# Patient Record
Sex: FEMALE | Race: WHITE | Employment: OTHER | URBAN - METROPOLITAN AREA
[De-identification: names, ages, dates, MRNs, and addresses within clinical notes are randomized per-mention and may not be internally consistent; named-entity substitution may affect disease eponyms.]

---

## 2019-02-17 ENCOUNTER — APPOINTMENT (OUTPATIENT)
Dept: CT IMAGING | Age: 64
End: 2019-02-17
Attending: NURSE PRACTITIONER
Payer: MEDICARE

## 2019-02-17 ENCOUNTER — HOSPITAL ENCOUNTER (EMERGENCY)
Age: 64
Discharge: HOME | End: 2019-02-17
Attending: EMERGENCY MEDICINE
Payer: MEDICARE

## 2019-02-17 VITALS
DIASTOLIC BLOOD PRESSURE: 78 MMHG | HEART RATE: 90 BPM | WEIGHT: 120.37 LBS | HEIGHT: 61 IN | RESPIRATION RATE: 16 BRPM | TEMPERATURE: 98 F | SYSTOLIC BLOOD PRESSURE: 148 MMHG | BODY MASS INDEX: 22.73 KG/M2 | OXYGEN SATURATION: 97 %

## 2019-02-17 DIAGNOSIS — R10.32 ABDOMINAL PAIN, LLQ (LEFT LOWER QUADRANT): Primary | ICD-10-CM

## 2019-02-17 LAB
ALBUMIN SERPL-MCNC: 4 G/DL (ref 3.5–5)
ALBUMIN/GLOB SERPL: 1.2 {RATIO} (ref 1.1–2.2)
ALP SERPL-CCNC: 91 U/L (ref 45–117)
ALT SERPL-CCNC: 39 U/L (ref 12–78)
ANION GAP SERPL CALC-SCNC: 6 MMOL/L (ref 5–15)
APPEARANCE UR: CLEAR
AST SERPL-CCNC: 36 U/L (ref 15–37)
BACTERIA URNS QL MICRO: NEGATIVE /HPF
BASOPHILS # BLD: 0.1 K/UL (ref 0–0.1)
BASOPHILS NFR BLD: 2 % (ref 0–1)
BILIRUB SERPL-MCNC: 0.5 MG/DL (ref 0.2–1)
BILIRUB UR QL: NEGATIVE
BUN SERPL-MCNC: 12 MG/DL (ref 6–20)
BUN/CREAT SERPL: 13 (ref 12–20)
CALCIUM SERPL-MCNC: 9.5 MG/DL (ref 8.5–10.1)
CHLORIDE SERPL-SCNC: 105 MMOL/L (ref 97–108)
CO2 SERPL-SCNC: 28 MMOL/L (ref 21–32)
COLOR UR: ABNORMAL
CREAT SERPL-MCNC: 0.92 MG/DL (ref 0.55–1.02)
DIFFERENTIAL METHOD BLD: ABNORMAL
EOSINOPHIL # BLD: 0.1 K/UL (ref 0–0.4)
EOSINOPHIL NFR BLD: 1 % (ref 0–7)
EPITH CASTS URNS QL MICRO: ABNORMAL /LPF
ERYTHROCYTE [DISTWIDTH] IN BLOOD BY AUTOMATED COUNT: 12.8 % (ref 11.5–14.5)
GLOBULIN SER CALC-MCNC: 3.3 G/DL (ref 2–4)
GLUCOSE SERPL-MCNC: 87 MG/DL (ref 65–100)
GLUCOSE UR STRIP.AUTO-MCNC: NEGATIVE MG/DL
HCT VFR BLD AUTO: 44.3 % (ref 35–47)
HGB BLD-MCNC: 15.2 G/DL (ref 11.5–16)
HGB UR QL STRIP: NEGATIVE
IMM GRANULOCYTES # BLD AUTO: 0 K/UL (ref 0–0.04)
IMM GRANULOCYTES NFR BLD AUTO: 0 % (ref 0–0.5)
KETONES UR QL STRIP.AUTO: NEGATIVE MG/DL
LEUKOCYTE ESTERASE UR QL STRIP.AUTO: ABNORMAL
LIPASE SERPL-CCNC: 136 U/L (ref 73–393)
LYMPHOCYTES # BLD: 1.9 K/UL (ref 0.8–3.5)
LYMPHOCYTES NFR BLD: 29 % (ref 12–49)
MCH RBC QN AUTO: 31.5 PG (ref 26–34)
MCHC RBC AUTO-ENTMCNC: 34.3 G/DL (ref 30–36.5)
MCV RBC AUTO: 91.9 FL (ref 80–99)
MONOCYTES # BLD: 0.6 K/UL (ref 0–1)
MONOCYTES NFR BLD: 10 % (ref 5–13)
NEUTS SEG # BLD: 3.7 K/UL (ref 1.8–8)
NEUTS SEG NFR BLD: 58 % (ref 32–75)
NITRITE UR QL STRIP.AUTO: POSITIVE
NRBC # BLD: 0 K/UL (ref 0–0.01)
NRBC BLD-RTO: 0 PER 100 WBC
PH UR STRIP: 7 [PH] (ref 5–8)
PLATELET # BLD AUTO: 293 K/UL (ref 150–400)
PMV BLD AUTO: 10.1 FL (ref 8.9–12.9)
POTASSIUM SERPL-SCNC: 4.2 MMOL/L (ref 3.5–5.1)
PROT SERPL-MCNC: 7.3 G/DL (ref 6.4–8.2)
PROT UR STRIP-MCNC: NEGATIVE MG/DL
RBC # BLD AUTO: 4.82 M/UL (ref 3.8–5.2)
RBC #/AREA URNS HPF: ABNORMAL /HPF (ref 0–5)
SODIUM SERPL-SCNC: 139 MMOL/L (ref 136–145)
SP GR UR REFRACTOMETRY: 1.01 (ref 1–1.03)
UROBILINOGEN UR QL STRIP.AUTO: 1 EU/DL (ref 0.2–1)
WBC # BLD AUTO: 6.4 K/UL (ref 3.6–11)
WBC URNS QL MICRO: ABNORMAL /HPF (ref 0–4)

## 2019-02-17 PROCEDURE — 81001 URINALYSIS AUTO W/SCOPE: CPT

## 2019-02-17 PROCEDURE — 74177 CT ABD & PELVIS W/CONTRAST: CPT

## 2019-02-17 PROCEDURE — 96374 THER/PROPH/DIAG INJ IV PUSH: CPT

## 2019-02-17 PROCEDURE — 96361 HYDRATE IV INFUSION ADD-ON: CPT

## 2019-02-17 PROCEDURE — 83690 ASSAY OF LIPASE: CPT

## 2019-02-17 PROCEDURE — 74011636320 HC RX REV CODE- 636/320: Performed by: EMERGENCY MEDICINE

## 2019-02-17 PROCEDURE — 80053 COMPREHEN METABOLIC PANEL: CPT

## 2019-02-17 PROCEDURE — 85025 COMPLETE CBC W/AUTO DIFF WBC: CPT

## 2019-02-17 PROCEDURE — 36415 COLL VENOUS BLD VENIPUNCTURE: CPT

## 2019-02-17 PROCEDURE — 74011250636 HC RX REV CODE- 250/636: Performed by: NURSE PRACTITIONER

## 2019-02-17 PROCEDURE — 99283 EMERGENCY DEPT VISIT LOW MDM: CPT

## 2019-02-17 RX ORDER — KETOROLAC TROMETHAMINE 10 MG/1
10 TABLET, FILM COATED ORAL
Qty: 20 TAB | Refills: 0 | Status: SHIPPED | OUTPATIENT
Start: 2019-02-17

## 2019-02-17 RX ORDER — KETOROLAC TROMETHAMINE 30 MG/ML
15 INJECTION, SOLUTION INTRAMUSCULAR; INTRAVENOUS
Status: COMPLETED | OUTPATIENT
Start: 2019-02-17 | End: 2019-02-17

## 2019-02-17 RX ORDER — LATANOPROST 50 UG/ML
1 SOLUTION/ DROPS OPHTHALMIC
COMMUNITY

## 2019-02-17 RX ORDER — SULFAMETHOXAZOLE AND TRIMETHOPRIM 800; 160 MG/1; MG/1
1 TABLET ORAL 2 TIMES DAILY
COMMUNITY

## 2019-02-17 RX ORDER — SODIUM CHLORIDE 0.9 % (FLUSH) 0.9 %
10 SYRINGE (ML) INJECTION
Status: COMPLETED | OUTPATIENT
Start: 2019-02-17 | End: 2019-02-17

## 2019-02-17 RX ORDER — FEXOFENADINE HCL AND PSEUDOEPHEDRINE HCI 60; 120 MG/1; MG/1
1 TABLET, EXTENDED RELEASE ORAL EVERY 12 HOURS
COMMUNITY

## 2019-02-17 RX ADMIN — KETOROLAC TROMETHAMINE 15 MG: 30 INJECTION, SOLUTION INTRAMUSCULAR at 16:42

## 2019-02-17 RX ADMIN — Medication 10 ML: at 17:23

## 2019-02-17 RX ADMIN — SODIUM CHLORIDE 1000 ML: 900 INJECTION, SOLUTION INTRAVENOUS at 16:42

## 2019-02-17 RX ADMIN — IOPAMIDOL 100 ML: 755 INJECTION, SOLUTION INTRAVENOUS at 17:23

## 2019-02-17 NOTE — DISCHARGE INSTRUCTIONS
Patient Education        Abdominal Pain: Care Instructions  Your Care Instructions    Abdominal pain has many possible causes. Some aren't serious and get better on their own in a few days. Others need more testing and treatment. If your pain continues or gets worse, you need to be rechecked and may need more tests to find out what is wrong. You may need surgery to correct the problem. Don't ignore new symptoms, such as fever, nausea and vomiting, urination problems, pain that gets worse, and dizziness. These may be signs of a more serious problem. Your doctor may have recommended a follow-up visit in the next 8 to 12 hours. If you are not getting better, you may need more tests or treatment. The doctor has checked you carefully, but problems can develop later. If you notice any problems or new symptoms, get medical treatment right away. Follow-up care is a key part of your treatment and safety. Be sure to make and go to all appointments, and call your doctor if you are having problems. It's also a good idea to know your test results and keep a list of the medicines you take. How can you care for yourself at home? · Rest until you feel better. · To prevent dehydration, drink plenty of fluids, enough so that your urine is light yellow or clear like water. Choose water and other caffeine-free clear liquids until you feel better. If you have kidney, heart, or liver disease and have to limit fluids, talk with your doctor before you increase the amount of fluids you drink. · If your stomach is upset, eat mild foods, such as rice, dry toast or crackers, bananas, and applesauce. Try eating several small meals instead of two or three large ones. · Wait until 48 hours after all symptoms have gone away before you have spicy foods, alcohol, and drinks that contain caffeine. · Do not eat foods that are high in fat. · Avoid anti-inflammatory medicines such as aspirin, ibuprofen (Advil, Motrin), and naproxen (Aleve). These can cause stomach upset. Talk to your doctor if you take daily aspirin for another health problem. When should you call for help? Call 911 anytime you think you may need emergency care. For example, call if:    · You passed out (lost consciousness).     · You pass maroon or very bloody stools.     · You vomit blood or what looks like coffee grounds.     · You have new, severe belly pain.    Call your doctor now or seek immediate medical care if:    · Your pain gets worse, especially if it becomes focused in one area of your belly.     · You have a new or higher fever.     · Your stools are black and look like tar, or they have streaks of blood.     · You have unexpected vaginal bleeding.     · You have symptoms of a urinary tract infection. These may include:  ? Pain when you urinate. ? Urinating more often than usual.  ? Blood in your urine.     · You are dizzy or lightheaded, or you feel like you may faint.    Watch closely for changes in your health, and be sure to contact your doctor if:    · You are not getting better after 1 day (24 hours). Where can you learn more? Go to http://cat-geeta.info/. Enter J117 in the search box to learn more about \"Abdominal Pain: Care Instructions. \"  Current as of: September 23, 2018  Content Version: 11.9  © 8048-9934 [employer]. Care instructions adapted under license by [employer] (which disclaims liability or warranty for this information). If you have questions about a medical condition or this instruction, always ask your healthcare professional. [place] [address] any warranty or liability for your use of this information. We hope that we have addressed all of your medical concerns. The examination and treatment you received in the Emergency Department were for an emergent problem and were not intended as complete care.  It is important that you follow up with your healthcare provider(s) for ongoing care. If your symptoms worsen or do not improve as expected, and you are unable to reach your usual health care provider(s), you should return to the Emergency Department. [facility] participate in nationally recognized quality of care measures. If your blood pressure is greater than 120/80, as reported below, we urge that you seek medical care to address the potential of high blood pressure, commonly known as hypertension. Hypertension can be hereditary or can be caused by certain medical conditions, pain, stress, or \"white coat syndrome. \"       Please make an appointment with your health care provider(s) for follow up of your Emergency Department visit. VITALS:   Patient Vitals for the past 8 hrs:   Temp Pulse Resp BP SpO2   02/17/19 1455 98 °F (36.7 °C) (!) 105 18 149/70 98 %          Thank you for allowing us to provide you with medical care today. We realize that you have many choices for your emergency care needs. Please choose us in the future for any continued health care needs. Miriam Rizvi NP              Recent Results (from the past 24 hour(s))   CBC WITH AUTOMATED DIFF    Collection Time: 02/17/19  3:28 PM   Result Value Ref Range    WBC 6.4 3.6 - 11.0 K/uL    RBC 4.82 3.80 - 5.20 M/uL    HGB 15.2 11.5 - 16.0 g/dL    HCT 44.3 35.0 - 47.0 %    MCV 91.9 80.0 - 99.0 FL    MCH 31.5 26.0 - 34.0 PG    MCHC 34.3 30.0 - 36.5 g/dL    RDW 12.8 11.5 - 14.5 %    PLATELET 955 482 - 980 K/uL    MPV 10.1 8.9 - 12.9 FL    NRBC 0.0 0  WBC    ABSOLUTE NRBC 0.00 0.00 - 0.01 K/uL    NEUTROPHILS 58 32 - 75 %    LYMPHOCYTES 29 12 - 49 %    MONOCYTES 10 5 - 13 %    EOSINOPHILS 1 0 - 7 %    BASOPHILS 2 (H) 0 - 1 %    IMMATURE GRANULOCYTES 0 0.0 - 0.5 %    ABS. NEUTROPHILS 3.7 1.8 - 8.0 K/UL    ABS. LYMPHOCYTES 1.9 0.8 - 3.5 K/UL    ABS. MONOCYTES 0.6 0.0 - 1.0 K/UL    ABS. EOSINOPHILS 0.1 0.0 - 0.4 K/UL    ABS.  BASOPHILS 0.1 0.0 - 0.1 K/UL ABS. IMM. GRANS. 0.0 0.00 - 0.04 K/UL    DF AUTOMATED     METABOLIC PANEL, COMPREHENSIVE    Collection Time: 02/17/19  3:28 PM   Result Value Ref Range    Sodium 139 136 - 145 mmol/L    Potassium 4.2 3.5 - 5.1 mmol/L    Chloride 105 97 - 108 mmol/L    CO2 28 21 - 32 mmol/L    Anion gap 6 5 - 15 mmol/L    Glucose 87 65 - 100 mg/dL    BUN 12 6 - 20 MG/DL    Creatinine 0.92 0.55 - 1.02 MG/DL    BUN/Creatinine ratio 13 12 - 20      GFR est AA >60 >60 ml/min/1.73m2    GFR est non-AA >60 >60 ml/min/1.73m2    Calcium 9.5 8.5 - 10.1 MG/DL    Bilirubin, total 0.5 0.2 - 1.0 MG/DL    ALT (SGPT) 39 12 - 78 U/L    AST (SGOT) 36 15 - 37 U/L    Alk. phosphatase 91 45 - 117 U/L    Protein, total 7.3 6.4 - 8.2 g/dL    Albumin 4.0 3.5 - 5.0 g/dL    Globulin 3.3 2.0 - 4.0 g/dL    A-G Ratio 1.2 1.1 - 2.2     LIPASE    Collection Time: 02/17/19  3:28 PM   Result Value Ref Range    Lipase 136 73 - 393 U/L   URINALYSIS W/ RFLX MICROSCOPIC    Collection Time: 02/17/19  3:29 PM   Result Value Ref Range    Color ORANGE, color may affect macroscopic results      Appearance CLEAR CLEAR      Specific gravity 1.007 1.003 - 1.030      pH (UA) 7.0 5.0 - 8.0      Protein NEGATIVE  NEG mg/dL    Glucose NEGATIVE  NEG mg/dL    Ketone NEGATIVE  NEG mg/dL    Bilirubin NEGATIVE  NEG      Blood NEGATIVE  NEG      Urobilinogen 1.0 0.2 - 1.0 EU/dL    Nitrites POSITIVE (A) NEG      Leukocyte Esterase SMALL (A) NEG      WBC 0-4 0 - 4 /hpf    RBC 0-5 0 - 5 /hpf    Epithelial cells FEW FEW /lpf    Bacteria NEGATIVE  NEG /hpf       Ct Abd Pelv W Cont    Result Date: 2/17/2019  INDICATION: LLQ pain COMPARISON: None TECHNIQUE: Following the uneventful intravenous administration of 100 cc Isovue-370, thin axial images were obtained through the abdomen and pelvis. Coronal and sagittal reconstructions were generated. Oral contrast was not administered.  CT dose reduction was achieved through use of a standardized protocol tailored for this examination and automatic exposure control for dose modulation. FINDINGS: LUNG BASES: A curvilinear density at the left lung base posterolaterally which is associated with a calcification extends to the pleural surface. This most likely represents parenchymal scarring but since this cannot be identified on previous chest x-rays, follow-up may be considered. LIVER: No mass or biliary dilatation. GALLBLADDER: Absent SPLEEN: No mass. PANCREAS: No mass or ductal dilatation. ADRENALS: Unremarkable. KIDNEYS: No mass, calculus, or hydronephrosis. GI: No dilatation or wall thickening. There is a moderate amount of fecal material throughout the colon. APPENDIX: Not identified PERITONEUM: No ascites or pneumoperitoneum. RETROPERITONEUM: No lymphadenopathy or aortic aneurysm. URINARY BLADDER: No mass or calculus. PELVIS: No adenopathy or abnormal mass. BONES: Degenerative changes of the mid lumbar spine noted. ADDITIONAL COMMENTS: N/A     IMPRESSION: 1. No acute finding the abdomen or pelvis. 2. Moderate amount of fecal material throughout the colon. 3. Finding at the left lung base as described above. Follow-up unenhanced chest CT in 4-6 months may be considered.

## 2019-02-17 NOTE — ED PROVIDER NOTES
EMERGENCY DEPARTMENT HISTORY AND PHYSICAL EXAM 
 
 
Date: 2/17/2019 Patient Name: Sathish Obrien History of Presenting Illness Chief Complaint Patient presents with  Pelvic Pain The patient presents to the ED with complaints of pelvic pain, recently diagnosed with a kidney infection and taking bactrim. Was told to come to the ED if she does not get better. History Provided By: Patient HPI: Sathish Obrien, 70 y.o. female with PMHx significant for GERD and recurrent UTIs currently followed by Urology and Bactrim, presents ambulatory from home to the ED with cc of ongoing left lower quad pain. Seen by Urology and diagnosed with UTI started on Bactrim two days ago. Symptoms have progressed and she was referred to the ED for evaluation. She has not taken anything for pain. Pt denies fevers, chills, night sweats, chest pain, pressure, SOB, VANCE, PND, orthopnea, n/v/d, melena, hematuria, dysuria, constipation, HA, dizziness, and syncope There are no other complaints, changes, or physical findings at this time. PCP: Michael Renteria MD 
 
No current facility-administered medications on file prior to encounter. Current Outpatient Medications on File Prior to Encounter Medication Sig Dispense Refill  PROGESTERONE Take 150 mg by mouth daily.  fexofenadine-pseudoephedrine (ALLEGRA-D 12 HOUR)  mg Tb12 Take 1 Tab by mouth every twelve (12) hours.  fluticasone propionate (FLONASE NA) by Nasal route.  acetaminophen/diphenhydramine (TYLENOL PM PO) Take  by mouth.  phenazopy/facial-body wipes (PHENAZOPYRIDINE) 99.5 mg by Does Not Apply route as needed.  trimethoprim-sulfamethoxazole (BACTRIM DS) 160-800 mg per tablet Take 1 Tab by mouth two (2) times a day.  latanoprost (XALATAN) 0.005 % ophthalmic solution Administer 1 Drop to both eyes nightly.  lansoprazole (PREVACID) 15 mg capsule Take  by mouth Daily (before breakfast).  ciprofloxacin (CIPRO) 250 mg tablet Take 500 mg by mouth as needed. As needed for frequent bladder infections.  ascorbic acid (VITAMIN C) 250 mg tablet Take  by mouth daily.  Cholecalciferol, Vitamin D3, (VITAMIN D) 1,000 unit Cap Take  by mouth daily.  OTHER Past History Past Medical History: 
Past Medical History:  
Diagnosis Date  Gastrointestinal disorder   
 gerd Past Surgical History: 
Past Surgical History:  
Procedure Laterality Date  HX CHOLECYSTECTOMY Family History: 
History reviewed. No pertinent family history. Social History: 
Social History Tobacco Use  Smoking status: Never Smoker  Smokeless tobacco: Never Used Substance Use Topics  Alcohol use: No  
  Frequency: Never  Drug use: No  
 
 
Allergies: Allergies Allergen Reactions  Macrodantin [Nitrofurantoin Macrocrystalline] Anaphylaxis  Pcn [Penicillins] Anaphylaxis  Tetracycline Nausea and Vomiting Review of Systems Review of Systems Constitutional: Negative for activity change, appetite change, chills, diaphoresis, fatigue, fever and unexpected weight change. HENT: Negative for congestion, ear pain, rhinorrhea, sinus pressure, sore throat and tinnitus. Eyes: Negative for photophobia, pain, discharge and visual disturbance. Respiratory: Negative for apnea, cough, choking, chest tightness, shortness of breath, wheezing and stridor. Cardiovascular: Negative for chest pain, palpitations and leg swelling. Gastrointestinal: Positive for abdominal pain. Negative for constipation, diarrhea, nausea and vomiting. Endocrine: Negative for polydipsia, polyphagia and polyuria. Genitourinary: Negative for decreased urine volume, dyspareunia, dysuria, enuresis, flank pain, frequency, hematuria and urgency. Musculoskeletal: Negative for arthralgias, back pain, gait problem, myalgias and neck pain. Skin: Negative for color change, pallor, rash and wound. Allergic/Immunologic: Negative for immunocompromised state. Neurological: Negative for dizziness, seizures, syncope, weakness, light-headedness and headaches. Hematological: Does not bruise/bleed easily. Psychiatric/Behavioral: Negative for agitation and confusion. The patient is not nervous/anxious. Physical Exam  
Physical Exam  
Constitutional: She is oriented to person, place, and time. She appears well-developed and well-nourished. No distress. HENT:  
Head: Normocephalic. Right Ear: External ear normal.  
Left Ear: External ear normal.  
Mouth/Throat: Oropharynx is clear and moist. No oropharyngeal exudate. Eyes: Conjunctivae and EOM are normal. Pupils are equal, round, and reactive to light. Right eye exhibits no discharge. Left eye exhibits no discharge. No scleral icterus. Neck: Normal range of motion. Neck supple. No JVD present. No tracheal deviation present. No thyromegaly present. Cardiovascular: Normal rate, regular rhythm, normal heart sounds and intact distal pulses. Exam reveals no gallop and no friction rub. No murmur heard. Pulmonary/Chest: Effort normal and breath sounds normal. No stridor. No respiratory distress. She has no wheezes. She has no rales. She exhibits no tenderness. Abdominal: Soft. Bowel sounds are normal. She exhibits no distension and no mass. There is tenderness in the left lower quadrant. There is no rebound and no guarding. Musculoskeletal: Normal range of motion. She exhibits no edema or tenderness. Lymphadenopathy:  
  She has no cervical adenopathy. Neurological: She is alert and oriented to person, place, and time. She displays normal reflexes. No cranial nerve deficit. Coordination normal.  
Skin: Skin is warm and dry. No rash noted. She is not diaphoretic. No erythema. No pallor. Psychiatric: She has a normal mood and affect.  Her behavior is normal. Judgment and thought content normal.  
Nursing note and vitals reviewed. Diagnostic Study Results Labs - Recent Results (from the past 12 hour(s)) CBC WITH AUTOMATED DIFF Collection Time: 02/17/19  3:28 PM  
Result Value Ref Range WBC 6.4 3.6 - 11.0 K/uL  
 RBC 4.82 3.80 - 5.20 M/uL  
 HGB 15.2 11.5 - 16.0 g/dL HCT 44.3 35.0 - 47.0 % MCV 91.9 80.0 - 99.0 FL  
 MCH 31.5 26.0 - 34.0 PG  
 MCHC 34.3 30.0 - 36.5 g/dL  
 RDW 12.8 11.5 - 14.5 % PLATELET 038 755 - 150 K/uL MPV 10.1 8.9 - 12.9 FL  
 NRBC 0.0 0  WBC ABSOLUTE NRBC 0.00 0.00 - 0.01 K/uL NEUTROPHILS 58 32 - 75 % LYMPHOCYTES 29 12 - 49 % MONOCYTES 10 5 - 13 % EOSINOPHILS 1 0 - 7 % BASOPHILS 2 (H) 0 - 1 % IMMATURE GRANULOCYTES 0 0.0 - 0.5 % ABS. NEUTROPHILS 3.7 1.8 - 8.0 K/UL  
 ABS. LYMPHOCYTES 1.9 0.8 - 3.5 K/UL  
 ABS. MONOCYTES 0.6 0.0 - 1.0 K/UL  
 ABS. EOSINOPHILS 0.1 0.0 - 0.4 K/UL  
 ABS. BASOPHILS 0.1 0.0 - 0.1 K/UL  
 ABS. IMM. GRANS. 0.0 0.00 - 0.04 K/UL  
 DF AUTOMATED METABOLIC PANEL, COMPREHENSIVE Collection Time: 02/17/19  3:28 PM  
Result Value Ref Range Sodium 139 136 - 145 mmol/L Potassium 4.2 3.5 - 5.1 mmol/L Chloride 105 97 - 108 mmol/L  
 CO2 28 21 - 32 mmol/L Anion gap 6 5 - 15 mmol/L Glucose 87 65 - 100 mg/dL BUN 12 6 - 20 MG/DL Creatinine 0.92 0.55 - 1.02 MG/DL  
 BUN/Creatinine ratio 13 12 - 20 GFR est AA >60 >60 ml/min/1.73m2 GFR est non-AA >60 >60 ml/min/1.73m2 Calcium 9.5 8.5 - 10.1 MG/DL Bilirubin, total 0.5 0.2 - 1.0 MG/DL  
 ALT (SGPT) 39 12 - 78 U/L  
 AST (SGOT) 36 15 - 37 U/L Alk. phosphatase 91 45 - 117 U/L Protein, total 7.3 6.4 - 8.2 g/dL Albumin 4.0 3.5 - 5.0 g/dL Globulin 3.3 2.0 - 4.0 g/dL A-G Ratio 1.2 1.1 - 2.2 LIPASE Collection Time: 02/17/19  3:28 PM  
Result Value Ref Range Lipase 136 73 - 393 U/L  
URINALYSIS W/ RFLX MICROSCOPIC  Collection Time: 02/17/19  3:29 PM  
 Result Value Ref Range Color ORANGE, color may affect macroscopic results Appearance CLEAR CLEAR Specific gravity 1.007 1.003 - 1.030    
 pH (UA) 7.0 5.0 - 8.0 Protein NEGATIVE  NEG mg/dL Glucose NEGATIVE  NEG mg/dL Ketone NEGATIVE  NEG mg/dL Bilirubin NEGATIVE  NEG Blood NEGATIVE  NEG Urobilinogen 1.0 0.2 - 1.0 EU/dL Nitrites POSITIVE (A) NEG Leukocyte Esterase SMALL (A) NEG    
 WBC 0-4 0 - 4 /hpf  
 RBC 0-5 0 - 5 /hpf Epithelial cells FEW FEW /lpf Bacteria NEGATIVE  NEG /hpf Radiologic Studies -  
CT ABD PELV W CONT Final Result IMPRESSION:  
  
1. No acute finding the abdomen or pelvis. 2. Moderate amount of fecal material throughout the colon. 3. Finding at the left lung base as described above. Follow-up unenhanced chest  
CT in 4-6 months may be considered. CT Results  (Last 48 hours) 02/17/19 1722  CT ABD PELV W CONT Final result Impression:  IMPRESSION:  
   
1. No acute finding the abdomen or pelvis. 2. Moderate amount of fecal material throughout the colon. 3. Finding at the left lung base as described above. Follow-up unenhanced chest  
CT in 4-6 months may be considered. Narrative:  INDICATION: LLQ pain COMPARISON: None TECHNIQUE:   
Following the uneventful intravenous administration of 100 cc Isovue-370, thin  
axial images were obtained through the abdomen and pelvis. Coronal and sagittal  
reconstructions were generated. Oral contrast was not administered. CT dose  
reduction was achieved through use of a standardized protocol tailored for this  
examination and automatic exposure control for dose modulation. FINDINGS:   
LUNG BASES: A curvilinear density at the left lung base posterolaterally which  
is associated with a calcification extends to the pleural surface.  This most  
likely represents parenchymal scarring but since this cannot be identified on  
 previous chest x-rays, follow-up may be considered. LIVER: No mass or biliary dilatation. GALLBLADDER: Absent SPLEEN: No mass. PANCREAS: No mass or ductal dilatation. ADRENALS: Unremarkable. KIDNEYS: No mass, calculus, or hydronephrosis. GI: No dilatation or wall thickening. There is a moderate amount of fecal  
material throughout the colon. APPENDIX: Not identified PERITONEUM: No ascites or pneumoperitoneum. RETROPERITONEUM: No lymphadenopathy or aortic aneurysm. URINARY BLADDER: No mass or calculus. PELVIS: No adenopathy or abnormal mass. BONES: Degenerative changes of the mid lumbar spine noted. ADDITIONAL COMMENTS: N/A  
   
  
  
 
CXR Results  (Last 48 hours) None Medical Decision Making I am the first provider for this patient. I reviewed the vital signs, available nursing notes, past medical history, past surgical history, family history and social history. Vital Signs-Reviewed the patient's vital signs. Patient Vitals for the past 12 hrs: 
 Temp Pulse Resp BP SpO2  
02/17/19 1455 98 °F (36.7 °C) (!) 105 18 149/70 98 % Pulse Oximetry Analysis - 98% on RA Cardiac Monitor:  
Rate: 105 bpm 
 
 
Records Reviewed: Nursing Notes, Old Medical Records, Previous electrocardiograms, Previous Radiology Studies and Previous Laboratory Studies Provider Notes (Medical Decision Making): Abdominal pain  
 
Routine laboratory data and UA 
IVF Toradol CT abd/pelvis ED Course:  
Initial assessment performed. The patients presenting problems have been discussed, and they are in agreement with the care plan formulated and outlined with them. I have encouraged them to ask questions as they arise throughout their visit. Stable, ambulatory pt in [facility] Critical Care Time:  
0 Disposition: 
Discharge to home with PCP and Urology follow up PLAN: 
1. Current Discharge Medication List  
  
START taking these medications Details ketorolac (TORADOL) 10 mg tablet Take 1 Tab by mouth every six (6) hours as needed for Pain. Qty: 20 Tab, Refills: 0 Associated Diagnoses: Abdominal pain, LLQ (left lower quadrant) 2. Follow-up Information Follow up With Specialties Details Why Contact Info Estefanía Blount MD Family Practice In 2 days  [address] [place] [address] 
[phone] Return to ED if worse Diagnosis Clinical Impression: 1. Abdominal pain, LLQ (left lower quadrant) Attestations: 
 
Ritu Tejeda NP 
6:16 PM

## 2019-02-17 NOTE — ED NOTES
Susie Link NP reviewed discharge instructions with the patient. The patient verbalized understanding. All questions and concerns were addressed. The patient declined a wheelchair and is discharged ambulatory in the care of family members with instructions and prescriptions in hand. Pt is alert and oriented x 4. Respirations are clear and unlabored.

## 2020-04-01 ENCOUNTER — OFFICE VISIT (OUTPATIENT)
Dept: SURGERY | Age: 65
End: 2020-04-01

## 2020-04-01 VITALS
OXYGEN SATURATION: 97 % | HEIGHT: 61 IN | SYSTOLIC BLOOD PRESSURE: 135 MMHG | HEART RATE: 85 BPM | BODY MASS INDEX: 23.98 KG/M2 | WEIGHT: 127 LBS | TEMPERATURE: 97.9 F | DIASTOLIC BLOOD PRESSURE: 68 MMHG

## 2020-04-01 DIAGNOSIS — E04.1 RIGHT THYROID NODULE: Primary | ICD-10-CM

## 2020-04-01 RX ORDER — AZELASTINE 1 MG/ML
SPRAY, METERED NASAL
COMMUNITY
Start: 2020-03-19

## 2020-04-01 RX ORDER — PSEUDOEPHEDRINE HCL 120 MG/1
120 TABLET, FILM COATED, EXTENDED RELEASE ORAL
COMMUNITY

## 2020-04-01 NOTE — LETTER
4/1/20 Patient: Katja Lenz YOB: 1955 Date of Visit: 4/1/2020 Marc Millan MD 
[id number] [address] [address] VIA Facsimile: [phone] Dear Marc Millan MD, Thank you for referring Ms. Heath Cárdenas to [address] [place] [address] for evaluation. My notes for this consultation are attached. If you have questions, please do not hesitate to call me. I look forward to following your patient along with you.  
 
 
Sincerely, 
 
Daniella Erwin MD

## 2020-04-01 NOTE — PROGRESS NOTES
HISTORY OF PRESENT ILLNESS  Alicia Higgins is a 75 y.o. female who comes in for consultation by Therese Emery MD for a thyroid nodule  HPI  She had a cold recently and noted a tender right neck mass. She saw Therese Emery MD and had labs and an US. The US demonstrated a 2.2 cm mid thyroid nodule, partially cystic and several other smaller nodules. Biopsy of the larger lesion was recommended. She was euthyroid on labs. She states that the tenderness has resolved and denies dysphagia, dysphonia, neck pressure, pain, palpitations, fever, chills or sweats, prior ionizing radiation exposure, family hx thyroid disease. Past Medical History:   Diagnosis Date    Depression     Gastrointestinal disorder     gerd    GERD (gastroesophageal reflux disease)      Past Surgical History:   Procedure Laterality Date    HX CHOLECYSTECTOMY       Family History   Problem Relation Age of Onset    Heart Disease Mother     Cancer Father         lung    Cancer Paternal Aunt         melonoma    Diabetes Paternal Grandmother     Heart Disease Paternal Grandmother      Social History     Tobacco Use    Smoking status: Never Smoker    Smokeless tobacco: Never Used   Substance Use Topics    Alcohol use: No     Frequency: Never    Drug use: No     Current Outpatient Medications   Medication Sig    azelastine (ASTELIN) 137 mcg (0.1 %) nasal spray USE 2 SPRAYS IN EACH NOSTRIL TWICE A DAY    MAGNESIUM PO Take  by mouth.  pseudoephedrine CR (Sinus 12 Hour) 120 mg CR tablet Take 120 mg by mouth two (2) times daily as needed for Congestion.  PROGESTERONE Take 150 mg by mouth daily.  acetaminophen/diphenhydramine (TYLENOL PM PO) Take  by mouth.  phenazopy/facial-body wipes (PHENAZOPYRIDINE) 99.5 mg by Does Not Apply route as needed.  OTHER     lansoprazole (PREVACID) 15 mg capsule Take  by mouth Daily (before breakfast).  ciprofloxacin (CIPRO) 250 mg tablet Take 500 mg by mouth as needed.  As needed for frequent bladder infections.  ascorbic acid (VITAMIN C) 250 mg tablet Take  by mouth daily.  Cholecalciferol, Vitamin D3, (VITAMIN D) 1,000 unit Cap Take  by mouth daily.  fexofenadine-pseudoephedrine (ALLEGRA-D 12 HOUR)  mg Tb12 Take 1 Tab by mouth every twelve (12) hours.  fluticasone propionate (FLONASE NA) by Nasal route.  trimethoprim-sulfamethoxazole (BACTRIM DS) 160-800 mg per tablet Take 1 Tab by mouth two (2) times a day.  latanoprost (XALATAN) 0.005 % ophthalmic solution Administer 1 Drop to both eyes nightly.  ketorolac (TORADOL) 10 mg tablet Take 1 Tab by mouth every six (6) hours as needed for Pain. No current facility-administered medications for this visit. Allergies   Allergen Reactions    Macrodantin [Nitrofurantoin Macrocrystalline] Anaphylaxis    Pcn [Penicillins] Anaphylaxis    Tetracycline Nausea and Vomiting       Review of Systems   Constitutional: Positive for chills and malaise/fatigue. Negative for diaphoresis, fever and weight loss. HENT: Negative for sore throat. Eyes: Negative for blurred vision and discharge. Respiratory: Negative for cough, shortness of breath and wheezing. Cardiovascular: Negative for chest pain, palpitations, orthopnea, claudication and leg swelling. Gastrointestinal: Positive for constipation and heartburn. Negative for abdominal pain, diarrhea, melena, nausea and vomiting. Genitourinary: Negative for dysuria, flank pain, frequency and hematuria. Musculoskeletal: Positive for myalgias. Negative for back pain, joint pain and neck pain. Skin: Negative for rash. Neurological: Positive for dizziness. Negative for speech change, focal weakness, seizures, loss of consciousness, weakness and headaches. Endo/Heme/Allergies: Does not bruise/bleed easily. Psychiatric/Behavioral: Positive for depression. Negative for memory loss.      Visit Vitals  /68   Pulse 85   Temp 97.9 °F (36.6 °C)   Ht 5' 1\" (1.549 m) Wt 57.6 kg (127 lb)   SpO2 97%   BMI 24.00 kg/m²       Physical Exam  Constitutional:       General: She is not in acute distress. Appearance: She is well-developed. She is not diaphoretic. HENT:      Head: Normocephalic and atraumatic. Mouth/Throat:      Pharynx: No oropharyngeal exudate. Eyes:      General: No scleral icterus. Conjunctiva/sclera: Conjunctivae normal.      Pupils: Pupils are equal, round, and reactive to light. Neck:      Musculoskeletal: Normal range of motion and neck supple. Thyroid: Thyroid mass (2 cm mass) present. No thyromegaly or thyroid tenderness. Vascular: No JVD. Trachea: Trachea and phonation normal. No tracheal deviation. Cardiovascular:      Rate and Rhythm: Normal rate and regular rhythm. Heart sounds: No murmur. No friction rub. No gallop. Pulmonary:      Effort: Pulmonary effort is normal. No respiratory distress. Breath sounds: Normal breath sounds. No wheezing or rales. Abdominal:      General: Bowel sounds are normal. There is no distension. Palpations: Abdomen is soft. There is no mass. Tenderness: There is no abdominal tenderness. There is no guarding or rebound. Musculoskeletal: Normal range of motion. Lymphadenopathy:      Cervical: No cervical adenopathy. Right cervical: No superficial or deep cervical adenopathy. Left cervical: No superficial or deep cervical adenopathy. Skin:     General: Skin is warm and dry. Coloration: Skin is not pale. Findings: No erythema or rash. Neurological:      Mental Status: She is alert and oriented to person, place, and time. Cranial Nerves: No cranial nerve deficit. Psychiatric:         Behavior: Behavior normal.         Thought Content: Thought content normal.         Judgment: Judgment normal.         ASSESSMENT and PLAN  1. Right dominant thyroid nodule/mostly cystic and left smaller nodules.   I suspect this to be a multinodular goiter but need to r/o malignancy. I explained about the anatomy and pathophysiology of thyroid nodules/disease. I explained about possible malignancy. I discussed FNA, observation, possible thyroid suppression pending FNA, and total thyroidectomy. Risks of surgery include bleeding (potentially requiring emergent exploration at bedside), infection, parathyroid injury/removal requiring supplemental calcium +/- vit d, recurrent laryngeal/superior laryngeal nerve injury resulting in vocal cord paralysis, voice changes and fatigue, aspiration, further surgery, need for lifelong thyroid supplementation.   2.  Fatigue      She preferred moving toward US FNA of the right thyroid nodule and we did that today    We will call with results      Donny Durand MD FACS

## 2020-04-01 NOTE — PROGRESS NOTES
Chief Complaint   Patient presents with    Thyroid Problem     Pt seen @ the request of Dr. Good Damon to evaluate thyroid mass. 1. Have you been to the ER, urgent care clinic since your last visit? Hospitalized since your last visit? New consult    2. Have you seen or consulted any other health care providers outside of the [address] [place] [address] since your last visit? New consult  Include any pap smears or colon screening. Discussed advanced directive. Patient states that he/she does not have an advanced directive.

## 2020-04-02 NOTE — PROGRESS NOTES
Procedure Note    Pre Procedure Diagnosis:  Right thyroid nodule  Post Procedure Diagnosis:  Right thyroid nodule  Procedure:  1. Ultrasound guided FNA of right thyroid nodule  Surgeon:   Facundo Nascimento MD FACS    EBL minimal    Procedure:    After informed consent, I utilized a [employer] with a high frequency linear array transducer and two 22 and two 25 gauge needles to perform several passes through a 2.3 cm right thyroid heterogeneous nodule. Specimens were placed in specialized containers from [facility]. There was highly viscous colloid type fluid removed as well. She tolerated it well.     Specimen:   Right thyroid nodule    Signed  Facundo Nascimento MD FACS

## 2020-04-06 ENCOUNTER — TELEPHONE (OUTPATIENT)
Dept: SURGERY | Age: 65
End: 2020-04-06

## 2020-04-08 NOTE — TELEPHONE ENCOUNTER
Spoke w patient    She is feeling better and thinks it is smaller      She prefers repeat US in 6 months at Dr Tayla Rockwell office and if stable plan ongoing observation but if larger she would come back for a repeat FNA    Bessie Veloz MD FACS

## 2020-09-04 ENCOUNTER — TELEPHONE (OUTPATIENT)
Dept: INTERNAL MEDICINE CLINIC | Age: 65
End: 2020-09-04

## 2020-09-04 NOTE — TELEPHONE ENCOUNTER
[address] [facility] Office Pool               Appointment not available     [facility] first and last name and relationship to patient (if not the patient):       Best contact number: [phone]       Preferred date and time: First available       Scheduled appointment date and time: N/A       Reason for appointment: Pt is looking to schedule a NP appointment with the preferred provider but no slots available       Details to clarify the request:       Channel E Ruiz     Copy/Paste  ENVERA

## 2020-11-23 RX ORDER — FLUTICASONE PROPIONATE 50 MCG
SPRAY, SUSPENSION (ML) NASAL
COMMUNITY
End: 2020-11-24 | Stop reason: SDUPTHER

## 2020-11-24 ENCOUNTER — OFFICE VISIT (OUTPATIENT)
Dept: SURGERY | Age: 65
End: 2020-11-24
Payer: MEDICARE

## 2020-11-24 VITALS
WEIGHT: 123.3 LBS | HEIGHT: 61 IN | RESPIRATION RATE: 16 BRPM | HEART RATE: 72 BPM | BODY MASS INDEX: 23.28 KG/M2 | TEMPERATURE: 97.5 F | DIASTOLIC BLOOD PRESSURE: 74 MMHG | SYSTOLIC BLOOD PRESSURE: 126 MMHG | OXYGEN SATURATION: 96 %

## 2020-11-24 DIAGNOSIS — E04.1 RIGHT THYROID NODULE: Primary | ICD-10-CM

## 2020-11-24 PROCEDURE — G8400 PT W/DXA NO RESULTS DOC: HCPCS | Performed by: SURGERY

## 2020-11-24 PROCEDURE — 3017F COLORECTAL CA SCREEN DOC REV: CPT | Performed by: SURGERY

## 2020-11-24 PROCEDURE — 1090F PRES/ABSN URINE INCON ASSESS: CPT | Performed by: SURGERY

## 2020-11-24 PROCEDURE — G8536 NO DOC ELDER MAL SCRN: HCPCS | Performed by: SURGERY

## 2020-11-24 PROCEDURE — G8427 DOCREV CUR MEDS BY ELIG CLIN: HCPCS | Performed by: SURGERY

## 2020-11-24 PROCEDURE — 99213 OFFICE O/P EST LOW 20 MIN: CPT | Performed by: SURGERY

## 2020-11-24 PROCEDURE — G8420 CALC BMI NORM PARAMETERS: HCPCS | Performed by: SURGERY

## 2020-11-24 PROCEDURE — G8432 DEP SCR NOT DOC, RNG: HCPCS | Performed by: SURGERY

## 2020-11-24 PROCEDURE — 1101F PT FALLS ASSESS-DOCD LE1/YR: CPT | Performed by: SURGERY

## 2020-11-24 RX ORDER — TIMOLOL MALEATE 5 MG/ML
SOLUTION/ DROPS OPHTHALMIC
COMMUNITY

## 2020-11-24 NOTE — LETTER
11/24/20 Patient: Sally Andrade YOB: 1955 Date of Visit: 11/24/2020 Mary Serrano MD 
[facility] [employer] [facility] [address] [address] VIA Facsimile: [phone] Dear Mary Serrano MD, Thank you for referring Ms. Moe Hudson to [address] [place] [address] for evaluation. My notes for this consultation are attached. If you have questions, please do not hesitate to call me. I look forward to following your patient along with you.  
 
 
Sincerely, 
 
Geovani Kelly MD

## 2020-11-24 NOTE — PROGRESS NOTES
Chief Complaint   Patient presents with    Thyroid Problem     Seen at the request of maritza Dior thyroid biopsy.

## 2020-12-18 ENCOUNTER — OFFICE VISIT (OUTPATIENT)
Dept: SURGERY | Age: 65
End: 2020-12-18
Payer: MEDICARE

## 2020-12-18 VITALS
HEIGHT: 61 IN | BODY MASS INDEX: 21.34 KG/M2 | HEART RATE: 74 BPM | SYSTOLIC BLOOD PRESSURE: 142 MMHG | OXYGEN SATURATION: 98 % | DIASTOLIC BLOOD PRESSURE: 74 MMHG | RESPIRATION RATE: 16 BRPM | TEMPERATURE: 97.7 F | WEIGHT: 113 LBS

## 2020-12-18 DIAGNOSIS — E04.1 RIGHT THYROID NODULE: ICD-10-CM

## 2020-12-18 PROCEDURE — 10005 FNA BX W/US GDN 1ST LES: CPT | Performed by: SURGERY

## 2020-12-18 NOTE — PROGRESS NOTES
Limited Ultrasound of the thyroid    Procedure:  Ultrasound of thyroid  Indication:  Thyroid nodule  Surgeon:  Enrico Sinha MD FACS  Procedure:  Utilizing a [employer] S7 high frequency linear array transducer the thyroid was scanned and images obtained with special attention to the right lobe  Findings:  Hypoechoic irregularly marginated 1.1 x 0.9 x 1.0 cm nodule with microcalcification. A few smaller nodules with some calcifications were noted on the left as well.   Impression:  Suspicious for neoplasm TI5  Recommend:  Biopsy for histology    Enrico Sinha MD FACS

## 2020-12-18 NOTE — PROGRESS NOTES
[facility]  OFFICE PROCEDURE PROGRESS NOTE        Chart reviewed for the following:   Klever CARRILLO, have reviewed the History, Physical and updated the Allergic reactions for [address] performed immediately prior to start of procedure:   Klever CARRILLO, have performed the following reviews on Shama Cleaves prior to the start of the procedure:            * Patient was identified by name and date of birth   * Agreement on procedure being performed was verified  * Risks and Benefits explained to the patient  * Procedure site verified and marked as necessary  * Patient was positioned for comfort  * Consent was signed and verified     Time: 6431      Date of procedure: 12/18/2020    Procedure performed by:  Margarito Nash MD    Provider assisted by: Klever Ng LPN    Patient assisted by: Self    How tolerated by patient: tolerated well with no complaints    Post Procedural Pain Scale: 0    Comments: None

## 2020-12-18 NOTE — PROGRESS NOTES
Procedure Note    Pre Procedure Diagnosis:  Right thyroid nodule  Post Procedure Diagnosis:  Right thyroid nodule  Procedure:  1. Ultrasound guided FNA of right thyroid mass  Surgeon:   Carlotta Lopez MD FACS    EB minimal    Procedure:    After informed consent and time out, I utilized a [employer] with a high frequency linear array transducer and two 22 and two 25 gauge needles to perform several passes through a 1.1 x 0.9 x 1.0 cm right thyroid heterogeneous mass with calcification. Specimens were placed in specialized containers from [facility]. She tolerated it well.     Specimen:  Right thyroid nodule    Signed  Carlotta Lopez MD FACS

## 2020-12-18 NOTE — LETTER
12/18/2020 Patient: Fely Martinez YOB: 1955 Date of Visit: 12/18/2020 Jocelyn Powell MD 
[facility] [employer] [facility] [address] [address] Via Fax: [phone] Dear Jocelyn Powell MD, Thank you for referring Ms. Sofiya Durant to [address] [place], [place] [address]  for evaluation. My notes for this consultation are attached. If you have questions, please do not hesitate to call me. I look forward to following your patient along with you.  
 
 
Sincerely, 
 
Cha Woodson MD

## 2020-12-18 NOTE — PROGRESS NOTES
Identified pt with two pt identifiers(name and ). Reviewed record in preparation for visit and have obtained necessary documentation. All patient medications has been reviewed. Chief Complaint   Patient presents with    Surgery     fna right thyroid        Health Maintenance Due   Topic    Hepatitis C Screening     DTaP/Tdap/Td series (1 - Tdap)    Lipid Screen     Shingrix Vaccine Age 49> (1 of 2)    Colorectal Cancer Screening Combo     Breast Cancer Screen Mammogram     GLAUCOMA SCREENING Q2Y     Bone Densitometry (Dexa) Screening     Pneumococcal 65+ years (1 of 1 - PPSV23)    Medicare Yearly Exam     Flu Vaccine (1)       Vitals:    20 1600   BP: (!) 142/74   Pulse: 74   Resp: 16   Temp: 97.7 °F (36.5 °C)   TempSrc: Temporal   SpO2: 98%   Weight: 51.3 kg (113 lb)   Height: 5' 1\" (1.549 m)   PainSc:   0 - No pain       4. Have you been to the ER, urgent care clinic since your last visit? Hospitalized since your last visit? No    5. Have you seen or consulted any other health care providers outside of the [address] [place] [address] since your last visit? Include any pap smears or colon screening. No      Patient is accompanied by self I have received verbal consent from Yuki Talbert to discuss any/all medical information while they are present in the room.

## 2020-12-23 ENCOUNTER — TELEPHONE (OUTPATIENT)
Dept: SURGERY | Age: 65
End: 2020-12-23

## 2021-11-06 ENCOUNTER — TRANSCRIBE ORDER (OUTPATIENT)
Dept: SCHEDULING | Age: 66
End: 2021-11-06

## 2021-11-06 DIAGNOSIS — E83.119 HEMOCHROMATOSIS, UNSPECIFIED: Primary | ICD-10-CM

## 2021-11-14 ENCOUNTER — HOSPITAL ENCOUNTER (OUTPATIENT)
Dept: ULTRASOUND IMAGING | Age: 66
Discharge: HOME | End: 2021-11-14
Attending: INTERNAL MEDICINE
Payer: MEDICARE

## 2021-11-14 DIAGNOSIS — E83.119 HEMOCHROMATOSIS, UNSPECIFIED: ICD-10-CM

## 2021-11-14 PROCEDURE — 76700 US EXAM ABDOM COMPLETE: CPT

## 2021-11-17 ENCOUNTER — TRANSCRIBE ORDER (OUTPATIENT)
Dept: SCHEDULING | Age: 66
End: 2021-11-17

## 2021-11-17 DIAGNOSIS — E83.119 HEMOCHROMATOSIS, UNSPECIFIED: Primary | ICD-10-CM

## 2021-11-17 DIAGNOSIS — R10.84 GENERALIZED ABDOMINAL PAIN: ICD-10-CM

## 2021-11-23 ENCOUNTER — TELEPHONE (OUTPATIENT)
Dept: SURGERY | Age: 66
End: 2021-11-23

## 2021-11-23 DIAGNOSIS — E04.1 RIGHT THYROID NODULE: Primary | ICD-10-CM

## 2021-11-23 NOTE — TELEPHONE ENCOUNTER
Patient stated it is time for her yearly check and she wanted to know if Dr Zuly Collins wanted to do just a US and if so does he want to do it in the office or would he like to just schedule her for a US guided Bx of her thyroid in his office. I made her aware I would speak with him and I would call her back and let her know.

## 2021-11-23 NOTE — TELEPHONE ENCOUNTER
PT WOULD LIKE A CALLBACK FOR THE TIME TO [facility].  HER ULTRA SOUND

## 2021-12-02 NOTE — TELEPHONE ENCOUNTER
Called and left voicemail letting patient know her order was in the system, and gave her the number to call and schedule it [phone].

## 2021-12-07 ENCOUNTER — HOSPITAL ENCOUNTER (OUTPATIENT)
Dept: ULTRASOUND IMAGING | Age: 66
Discharge: HOME | End: 2021-12-07
Attending: SURGERY
Payer: MEDICARE

## 2021-12-07 DIAGNOSIS — E04.1 RIGHT THYROID NODULE: ICD-10-CM

## 2021-12-07 PROCEDURE — 76536 US EXAM OF HEAD AND NECK: CPT

## 2021-12-10 ENCOUNTER — TELEPHONE (OUTPATIENT)
Dept: SURGERY | Age: 66
End: 2021-12-10

## 2021-12-10 NOTE — TELEPHONE ENCOUNTER
Spoke with patient and let her know Dr Zuly Collins needs to review her thyroid ultrasound. Also her thyroid was biopsied last year so more than likely he will not do it again. I let patient know that she will her back from Dr Zuly Collins or myself after he has reviewed her ultrasound. Patient voiced understanding with no further questions at this time.

## 2021-12-10 NOTE — TELEPHONE ENCOUNTER
Pt would like to know if she needs to have a biopsy before the end of December 2021,  Please all Pt.

## 2022-01-10 ENCOUNTER — TELEPHONE (OUTPATIENT)
Dept: SURGERY | Age: 67
End: 2022-01-10

## 2022-01-10 NOTE — TELEPHONE ENCOUNTER
Spoke with patient and she wanted to know if Dr Zohreh Caraballo was planning on doing the FNA on Friday. I let her know that he is planning on it as long as that is what she would like to do. Patient voiced understanding with no further questions at this time.

## 2022-01-10 NOTE — TELEPHONE ENCOUNTER
Patient called and has appointment on Friday, patient is having questions regarding appointment and biopsy.     Please give a call back   BCB# [phone]

## 2022-01-14 ENCOUNTER — OFFICE VISIT (OUTPATIENT)
Dept: SURGERY | Age: 67
End: 2022-01-14
Payer: MEDICARE

## 2022-01-14 VITALS
HEART RATE: 88 BPM | TEMPERATURE: 97.3 F | OXYGEN SATURATION: 99 % | HEIGHT: 61 IN | SYSTOLIC BLOOD PRESSURE: 138 MMHG | DIASTOLIC BLOOD PRESSURE: 76 MMHG | WEIGHT: 112 LBS | RESPIRATION RATE: 16 BRPM | BODY MASS INDEX: 21.14 KG/M2

## 2022-01-14 DIAGNOSIS — E04.1 LEFT THYROID NODULE: Primary | ICD-10-CM

## 2022-01-14 PROCEDURE — G8427 DOCREV CUR MEDS BY ELIG CLIN: HCPCS | Performed by: SURGERY

## 2022-01-14 PROCEDURE — 3017F COLORECTAL CA SCREEN DOC REV: CPT | Performed by: SURGERY

## 2022-01-14 PROCEDURE — 10005 FNA BX W/US GDN 1ST LES: CPT | Performed by: SURGERY

## 2022-01-14 PROCEDURE — 1101F PT FALLS ASSESS-DOCD LE1/YR: CPT | Performed by: SURGERY

## 2022-01-14 PROCEDURE — G8400 PT W/DXA NO RESULTS DOC: HCPCS | Performed by: SURGERY

## 2022-01-14 PROCEDURE — 1090F PRES/ABSN URINE INCON ASSESS: CPT | Performed by: SURGERY

## 2022-01-14 PROCEDURE — G8420 CALC BMI NORM PARAMETERS: HCPCS | Performed by: SURGERY

## 2022-01-14 PROCEDURE — 99213 OFFICE O/P EST LOW 20 MIN: CPT | Performed by: SURGERY

## 2022-01-14 PROCEDURE — G8536 NO DOC ELDER MAL SCRN: HCPCS | Performed by: SURGERY

## 2022-01-14 PROCEDURE — G8510 SCR DEP NEG, NO PLAN REQD: HCPCS | Performed by: SURGERY

## 2022-01-14 NOTE — PROGRESS NOTES
HISTORY OF PRESENT ILLNESS  Yeimi Menchaca is a 74 y.o. female who comes in for follow up by Kun Lau MD for a thyroid nodule  Thyroid Problem  Pertinent negatives include no chest pain, no abdominal pain, no headaches and no shortness of breath. Follow-up  Pertinent negatives include no chest pain, no abdominal pain, no headaches and no shortness of breath. She had a cold in March 2020 and noted a tender right neck mass. She saw Kun Lau MD and had labs and an US. The US demonstrated a 2.2 cm mid thyroid nodule, partially cystic and several other smaller nodules. Biopsy of the larger lesion was recommended. She was euthyroid on labs. She states that the tenderness has resolved and denies dysphagia, dysphonia, neck pressure, pain, palpitations, fever, chills or sweats, prior ionizing radiation exposure, family hx thyroid disease. I did an US FNA 4/1/2020 which was non diagnostic but I had aspirated a thick viscous fluid from the lesion. She had a f/u US at [address] [place] [address] 11/17/2020 demonstrating a 7 x 10 x 5 mm hypoechoic nodule with single punctate echogenic focus that was TI_RADS 5 in the anterolateral inferior aspect of the right lobe. I did an US FNA of the right nodule 1218/2020 that was [place] II benign hyperplastic/adenomatoid nodule. Repeat US 12/1/2021 now demonstrates a 1.2 cm solid left nodule with calcifications for which biopsy was recommended.     Past Medical History:   Diagnosis Date    Depression     Gastrointestinal disorder     gerd    GERD (gastroesophageal reflux disease)     Hemochromatosis      Past Surgical History:   Procedure Laterality Date    HX CHOLECYSTECTOMY       Family History   Problem Relation Age of Onset    Heart Disease Mother     Cancer Father         lung    Cancer Paternal Aunt         melonoma    Diabetes Paternal Grandmother     Heart Disease Paternal Grandmother      Social History     Tobacco Use    Smoking status: Never Smoker    Smokeless tobacco: Never Used   Vaping Use    Vaping Use: Never used   Substance Use Topics    Alcohol use: No    Drug use: No     Current Outpatient Medications   Medication Sig    timolol (TIMOPTIC) 0.5 % ophthalmic solution timolol maleate 0.5 % eye drops    MAGNESIUM PO Take  by mouth.  pseudoephedrine CR (Sinus 12 Hour) 120 mg CR tablet Take 120 mg by mouth two (2) times daily as needed for Congestion.  acetaminophen/diphenhydramine (TYLENOL PM PO) Take  by mouth.  ciprofloxacin (CIPRO) 250 mg tablet Take 500 mg by mouth as needed. As needed for frequent bladder infections.  Cholecalciferol, Vitamin D3, (VITAMIN D) 1,000 unit Cap Take  by mouth daily.  azelastine (ASTELIN) 137 mcg (0.1 %) nasal spray USE 2 SPRAYS IN EACH NOSTRIL TWICE A DAY (Patient not taking: Reported on 1/14/2022)    PROGESTERONE Take 150 mg by mouth daily. (Patient not taking: Reported on 1/14/2022)    fexofenadine-pseudoephedrine (ALLEGRA-D 12 HOUR)  mg Tb12 Take 1 Tab by mouth every twelve (12) hours. (Patient not taking: Reported on 1/14/2022)    fluticasone propionate (FLONASE NA) by Nasal route. (Patient not taking: Reported on 1/14/2022)    phenazopy/facial-body wipes (PHENAZOPYRIDINE) 99.5 mg by Does Not Apply route as needed. (Patient not taking: Reported on 1/14/2022)    trimethoprim-sulfamethoxazole (BACTRIM DS) 160-800 mg per tablet Take 1 Tab by mouth two (2) times a day. (Patient not taking: Reported on 1/14/2022)    latanoprost (XALATAN) 0.005 % ophthalmic solution Administer 1 Drop to both eyes nightly. (Patient not taking: Reported on 1/14/2022)    ketorolac (TORADOL) 10 mg tablet Take 1 Tab by mouth every six (6) hours as needed for Pain. (Patient not taking: Reported on 1/14/2022)    OTHER  (Patient not taking: Reported on 1/14/2022)    lansoprazole (PREVACID) 15 mg capsule Take  by mouth Daily (before breakfast).  (Patient not taking: Reported on 1/14/2022)    ascorbic acid (VITAMIN C) 250 mg tablet Take  by mouth daily. (Patient not taking: Reported on 1/14/2022)     No current facility-administered medications for this visit. Allergies   Allergen Reactions    Macrodantin [Nitrofurantoin Macrocrystalline] Anaphylaxis    Pcn [Penicillins] Anaphylaxis    Tetracycline Nausea and Vomiting       Review of Systems   Constitutional: Positive for chills and malaise/fatigue. Negative for diaphoresis, fever and weight loss. HENT: Negative for sore throat. Eyes: Negative for blurred vision and discharge. Respiratory: Negative for cough, shortness of breath and wheezing. Cardiovascular: Negative for chest pain, palpitations, orthopnea, claudication and leg swelling. Gastrointestinal: Positive for constipation and heartburn. Negative for abdominal pain, diarrhea, melena, nausea and vomiting. Genitourinary: Negative for dysuria, flank pain, frequency and hematuria. Musculoskeletal: Positive for myalgias. Negative for back pain, joint pain and neck pain. Skin: Negative for rash. Neurological: Positive for dizziness. Negative for speech change, focal weakness, seizures, loss of consciousness, weakness and headaches. Endo/Heme/Allergies: Does not bruise/bleed easily. Psychiatric/Behavioral: Positive for depression. Negative for memory loss. Visit Vitals  /76 (BP 1 Location: Left upper arm, BP Patient Position: Sitting)   Pulse 88   Temp 97.3 °F (36.3 °C) (Temporal)   Resp 16   Ht 5' 1\" (1.549 m)   Wt 50.8 kg (112 lb)   SpO2 99%   BMI 21.16 kg/m²       Physical Exam  Constitutional:       General: She is not in acute distress. Appearance: She is well-developed. She is not diaphoretic. HENT:      Head: Normocephalic and atraumatic. Mouth/Throat:      Pharynx: No oropharyngeal exudate. Eyes:      General: No scleral icterus. Conjunctiva/sclera: Conjunctivae normal.      Pupils: Pupils are equal, round, and reactive to light.    Neck: Thyroid: Thyroid mass (barely palpable 1 cm nodule on right) present. No thyromegaly or thyroid tenderness. Vascular: No JVD. Trachea: Trachea and phonation normal. No tracheal deviation. Cardiovascular:      Rate and Rhythm: Normal rate and regular rhythm. Heart sounds: No murmur heard. No friction rub. No gallop. Pulmonary:      Effort: Pulmonary effort is normal. No respiratory distress. Breath sounds: Normal breath sounds. No wheezing or rales. Abdominal:      General: Bowel sounds are normal. There is no distension. Palpations: Abdomen is soft. There is no mass. Tenderness: There is no abdominal tenderness. There is no guarding or rebound. Musculoskeletal:         General: Normal range of motion. Cervical back: Normal range of motion and neck supple. Lymphadenopathy:      Cervical: No cervical adenopathy. Right cervical: No superficial or deep cervical adenopathy. Left cervical: No superficial or deep cervical adenopathy. Skin:     General: Skin is warm and dry. Coloration: Skin is not pale. Findings: No erythema or rash. Neurological:      Mental Status: She is alert and oriented to person, place, and time. Cranial Nerves: No cranial nerve deficit. Psychiatric:         Behavior: Behavior normal.         Thought Content: Thought content normal.         Judgment: Judgment normal.     US thyroid 12/1/2021  FINDINGS:  Thyroid is diffusely heterogeneous in echotexture with borderline  hypervascularity diffusely. There are multiple healed tiny hypoechoic nodules  bilaterally with a subtle hypoechoic nodule at the inferior pole of the right  thyroid measuring 6 x 6 x 7 mm and a hypoechoic hypovascular nodule with  possible punctate calcifications in the posterior mid left gland measuring 8 x 9  x 12 mm. The right lobe measures 1.0 x 1.8 x 4.7 cm and the left lobe measures 1.2 x 1.4  x 4.6 cm.  The isthmus measures 2 cm.  IMPRESSION  Multinodular goiter with largest nodule in the left gland  potentially containing punctate calcifications for which ultrasound-guided  fine-needle aspiration biopsy should be considered with additional nodules  including a very subtle subcentimeter hypoechoic nodule at the inferior right  gland. ASSESSMENT and PLAN  1. Multinodular thyroid with left dominant thyroid nodule now with punctate calcifications. Prior right nodule biopsy was negative . I suspect this to be a multinodular goiter but need to r/o malignancy. I explained about the anatomy and pathophysiology of thyroid nodules/disease. I explained about possible malignancy. I discussed FNA, observation, possible thyroid suppression pending FNA, and total thyroidectomy. Risks of surgery include bleeding (potentially requiring emergent exploration at bedside), infection, parathyroid injury/removal requiring supplemental calcium +/- vit d, recurrent laryngeal/superior laryngeal nerve injury resulting in vocal cord paralysis, voice changes and fatigue, aspiration, further surgery, need for lifelong thyroid supplementation. 2.  Fatigue is ongoing  3. Hemachromatosis.   Feeling better after phlebotomies  Followed by Dr Anne-Marie Phelps      She preferred moving forward with an US FNA of the left thyroid nodule and we did that today        Elli Dowling MD FACS

## 2022-01-14 NOTE — PROGRESS NOTES
Identified pt with two pt identifiers(name and ). Reviewed record in preparation for visit and have obtained necessary documentation. All patient medications has been reviewed. Chief Complaint   Patient presents with    Follow-up     discuss US result with possible Rt thyroid FNA       Health Maintenance Due   Topic    Hepatitis C Screening     COVID-19 Vaccine (1)    DTaP/Tdap/Td series (1 - Tdap)    Lipid Screen     Colorectal Cancer Screening Combo     Shingrix Vaccine Age 50> (1 of 2)    Breast Cancer Screen Mammogram     Bone Densitometry (Dexa) Screening     Pneumococcal 65+ years (1 of 1 - PPSV23)    Medicare Yearly Exam     Flu Vaccine (1)       Vitals:    22 1356   BP: 138/76   Pulse: 88   Resp: 16   Temp: 97.3 °F (36.3 °C)   TempSrc: Temporal   SpO2: 99%   Weight: 50.8 kg (112 lb)   Height: 5' 1\" (1.549 m)   PainSc:   0 - No pain       4. Have you been to the ER, urgent care clinic since your last visit? Hospitalized since your last visit? No    5. Have you seen or consulted any other health care providers outside of the [address] [place] [address] since your last visit? Include any pap smears or colon screening. No      Patient is accompanied by self I have received verbal consent from Chela Moore to discuss any/all medical information while they are present in the room.

## 2022-01-14 NOTE — PROGRESS NOTES
[facility]  OFFICE PROCEDURE PROGRESS NOTE        Chart reviewed for the following:   IBessie, have reviewed the History, Physical and updated the Allergic reactions for [address] performed immediately prior to start of procedure:   IBessie, have performed the following reviews on Pearletha Beverage prior to the start of the procedure:            * Patient was identified by name and date of birth   * Agreement on procedure being performed was verified  * Risks and Benefits explained to the patient  * Procedure site verified and marked as necessary  * Patient was positioned for comfort  * Consent was signed and verified     Time: 1500      Date of procedure: 1/14/2022    Procedure performed by:  Hardy Garcia MD    Provider assisted by: Bessie Mei LPN    Patient assisted by: Self    How tolerated by patient: tolerated well    Post Procedural Pain Scale: 0    Comments: Peewee sent to [facility].

## 2022-01-14 NOTE — LETTER
1/14/2022    Patient: Yeimi Menchaca   YOB: 1955   Date of Visit: 1/14/2022     Janeth Messer, [address]  [address] [address]  Via Fax: [phone]    Dear Janeth Messer MD,      Thank you for referring Ms. Bird Herron to  [facility] for evaluation. My notes for this consultation are attached. If you have questions, please do not hesitate to call me. I look forward to following your patient along with you.       Sincerely,    Hardy Garcia MD

## 2022-01-14 NOTE — PROGRESS NOTES
Procedure Note    Pre Procedure Diagnosis:  Left thyroid nodule  Post Procedure Diagnosis:  Left thyroid nodule  Procedure:  1. Ultrasound guided FNA of left thyroid nodule  Surgeon:   Alejandra Alberts MD FACS  SPECIMEN:  Left thyroid nodule  Anesthesia:  none  EBL minimal    Procedure:    After informed consent and time out, I utilized a Triplify[address] with a high frequency linear array transducer and two 22 and two 25 gauge needles to perform four passes through a 0.9 x 0.8 x 1.0  cm solid left thyroid nodule with microcalcifications. Specimens were placed in specialized containers from [facility]. She tolerated it well.         Signed  Alejandra Alberts MD FACS

## 2022-01-24 ENCOUNTER — TELEPHONE (OUTPATIENT)
Dept: SURGERY | Age: 67
End: 2022-01-24

## 2022-01-24 NOTE — TELEPHONE ENCOUNTER
US FNA left thyroid nodule was a benign hyperplastic/adenomatoid nodule      Recommend repeat US thyroid 1 year  RTC after US in 1 year    Left message      Simone Aase, MD FACS

## 2022-01-25 ENCOUNTER — TELEPHONE (OUTPATIENT)
Dept: SURGERY | Age: 67
End: 2022-01-25

## 2022-01-25 NOTE — TELEPHONE ENCOUNTER
Spoke with patient to let her know US FNA left thyroid nodule was a benign hyperplastic/adenomatoid nodule. I let her know that Dr Marshall Rose recommend repeat US thyroid 1 year, and RTC after US in 1 year. Patient says that she will give us a call when she is ready to schedule her appointment and US.

## 2022-03-19 PROBLEM — E04.1 LEFT THYROID NODULE: Status: ACTIVE | Noted: 2022-01-14

## 2022-03-19 PROBLEM — E04.1 RIGHT THYROID NODULE: Status: ACTIVE | Noted: 2020-04-01

## 2022-11-15 DIAGNOSIS — E04.1 RIGHT THYROID NODULE: Primary | ICD-10-CM

## 2022-11-28 ENCOUNTER — HOSPITAL ENCOUNTER (OUTPATIENT)
Dept: ULTRASOUND IMAGING | Age: 67
Discharge: HOME | End: 2022-11-28
Attending: SURGERY
Payer: MEDICARE

## 2022-11-28 DIAGNOSIS — E04.1 RIGHT THYROID NODULE: ICD-10-CM

## 2022-11-28 PROCEDURE — 76536 US EXAM OF HEAD AND NECK: CPT

## 2022-11-29 ENCOUNTER — TELEPHONE (OUTPATIENT)
Dept: SURGERY | Age: 67
End: 2022-11-29

## 2022-12-08 ENCOUNTER — TELEPHONE (OUTPATIENT)
Dept: SURGERY | Age: 67
End: 2022-12-08

## 2022-12-08 NOTE — TELEPHONE ENCOUNTER
Patient is returning a call to Dr. Juan Coles regarding the results of the most recent US Thyroid. Per note advised patient that the Thyroid US shows little/no change, that Dr. Juan Coles favors observation and she can follow up with Astrid Fermin MD. Patient would like to know how often her thyroid should be checked and if she is at risk of developing cancer since she has a family hx. Would like to speak with Dr. Juan Coles regarding concerns. [phone].

## 2022-12-13 NOTE — TELEPHONE ENCOUNTER
Spoke w patient    US unchanged  Nodule is stable      Favor observation  Follow up with Scott Kc MD with possible repeat US in 1-2 years    Becka Snider MD FACS

## 2023-12-13 ENCOUNTER — HOSPITAL ENCOUNTER (OUTPATIENT)
Facility: HOSPITAL | Age: 68
Discharge: HOME | End: 2023-12-16
Attending: INTERNAL MEDICINE
Payer: MEDICARE

## 2023-12-13 DIAGNOSIS — R10.84 ABDOMINAL PAIN, GENERALIZED: ICD-10-CM

## 2023-12-13 DIAGNOSIS — E83.119 DILATED CARDIOMYOPATHY SECONDARY TO HEMOCHROMATOSIS (HCC): ICD-10-CM

## 2023-12-13 DIAGNOSIS — I43 DILATED CARDIOMYOPATHY SECONDARY TO HEMOCHROMATOSIS (HCC): ICD-10-CM

## 2023-12-13 PROCEDURE — 76700 US EXAM ABDOM COMPLETE: CPT
